# Patient Record
Sex: MALE | Race: ASIAN | NOT HISPANIC OR LATINO
[De-identification: names, ages, dates, MRNs, and addresses within clinical notes are randomized per-mention and may not be internally consistent; named-entity substitution may affect disease eponyms.]

---

## 2019-04-18 ENCOUNTER — APPOINTMENT (OUTPATIENT)
Dept: CARDIOLOGY | Facility: CLINIC | Age: 29
End: 2019-04-18
Payer: MEDICAID

## 2019-04-18 VITALS
HEART RATE: 74 BPM | SYSTOLIC BLOOD PRESSURE: 122 MMHG | OXYGEN SATURATION: 97 % | TEMPERATURE: 98 F | DIASTOLIC BLOOD PRESSURE: 78 MMHG | HEIGHT: 70.5 IN | RESPIRATION RATE: 18 BRPM | BODY MASS INDEX: 26.47 KG/M2 | WEIGHT: 187 LBS

## 2019-04-18 DIAGNOSIS — Z78.9 OTHER SPECIFIED HEALTH STATUS: ICD-10-CM

## 2019-04-18 DIAGNOSIS — E78.5 HYPERLIPIDEMIA, UNSPECIFIED: ICD-10-CM

## 2019-04-18 DIAGNOSIS — L30.9 DERMATITIS, UNSPECIFIED: ICD-10-CM

## 2019-04-18 PROBLEM — Z00.00 ENCOUNTER FOR PREVENTIVE HEALTH EXAMINATION: Status: ACTIVE | Noted: 2019-04-18

## 2019-04-18 PROCEDURE — 93224 XTRNL ECG REC UP TO 48 HRS: CPT

## 2019-04-18 PROCEDURE — 99204 OFFICE O/P NEW MOD 45 MIN: CPT

## 2019-04-20 PROBLEM — Z78.9 NON-SMOKER: Status: ACTIVE | Noted: 2019-04-20

## 2019-04-29 ENCOUNTER — NON-APPOINTMENT (OUTPATIENT)
Age: 29
End: 2019-04-29

## 2019-04-30 ENCOUNTER — RESULT REVIEW (OUTPATIENT)
Age: 29
End: 2019-04-30

## 2019-04-30 ENCOUNTER — APPOINTMENT (OUTPATIENT)
Dept: CARDIOLOGY | Facility: CLINIC | Age: 29
End: 2019-04-30
Payer: MEDICAID

## 2019-04-30 DIAGNOSIS — R00.2 PALPITATIONS: ICD-10-CM

## 2019-04-30 PROCEDURE — 93306 TTE W/DOPPLER COMPLETE: CPT

## 2019-05-27 PROBLEM — E78.5 HLD (HYPERLIPIDEMIA): Status: ACTIVE | Noted: 2019-05-27

## 2019-05-27 PROBLEM — L30.9 ECZEMA: Status: ACTIVE | Noted: 2019-05-27

## 2019-05-27 NOTE — DISCUSSION/SUMMARY
[FreeTextEntry1] : 28 year-old male with HLD presents for evaluation of palpitations.  Patient reports that a year ago he had an episode of palpitations, described as fast heartbeats, lasting 3-4 minutes, associated with SOB.  He had a worse episode last month.  Patient denies CP, SOB, or lightheadedness.  He had one episode of fainting with fever. \par \par (1) Palpitations - His symptom is suggestive of SVT.  Patient underwent an echocardiogram and it showed normal LV function without significant valvular pathology.  Patient wore a Holter and it showed average HR of 88, one APC, without significant arrhythmia.  I instructed him the Valsalva maneuver to terminate SVT.  I advised patient to consider [employer] Watch to help document the SVT.\par \par (2) Followup - as needed.

## 2019-05-27 NOTE — HISTORY OF PRESENT ILLNESS
[FreeTextEntry1] : 28 year-old male with HLD presents for evaluation of palpitations.  Patient reports that a year ago he had an episode of palpitations, described as fast heartbeats, lasting 3-4 minutes, associated with SOB.  He had a worse episode last month.  Patient denies CP, SOB, or lightheadedness.  He had one episode of fainting with fever.

## 2019-05-27 NOTE — PHYSICAL EXAM
[General Appearance - Well Developed] : well developed [Normal Appearance] : normal appearance [General Appearance - Well Nourished] : well nourished [Well Groomed] : well groomed [No Deformities] : no deformities [General Appearance - In No Acute Distress] : no acute distress [Normal Conjunctiva] : the conjunctiva exhibited no abnormalities [Eyelids - No Xanthelasma] : the eyelids demonstrated no xanthelasmas [No Oral Cyanosis] : no oral cyanosis [No Oral Pallor] : no oral pallor [Normal Oral Mucosa] : normal oral mucosa [Normal Jugular Venous A Waves Present] : normal jugular venous A waves present [Normal Jugular Venous V Waves Present] : normal jugular venous V waves present [Heart Rate And Rhythm] : heart rate and rhythm were normal [No Jugular Venous Garza A Waves] : no jugular venous garza A waves [Murmurs] : no murmurs present [Heart Sounds] : normal S1 and S2 [Respiration, Rhythm And Depth] : normal respiratory rhythm and effort [Exaggerated Use Of Accessory Muscles For Inspiration] : no accessory muscle use [Abdomen Soft] : soft [Auscultation Breath Sounds / Voice Sounds] : lungs were clear to auscultation bilaterally [Abdomen Tenderness] : non-tender [Abdomen Mass (___ Cm)] : no abdominal mass palpated [Abnormal Walk] : normal gait [Gait - Sufficient For Exercise Testing] : the gait was sufficient for exercise testing [Nail Clubbing] : no clubbing of the fingernails [Cyanosis, Localized] : no localized cyanosis [Petechial Hemorrhages (___cm)] : no petechial hemorrhages [] : no ischemic changes [Affect] : the affect was normal [Oriented To Time, Place, And Person] : oriented to person, place, and time [No Anxiety] : not feeling anxious [Mood] : the mood was normal [Arterial Pulses Normal] : the arterial pulses were normal [Edema] : no peripheral edema present

## 2025-05-28 ENCOUNTER — APPOINTMENT (OUTPATIENT)
Dept: FAMILY MEDICINE | Facility: CLINIC | Age: 35
End: 2025-05-28